# Patient Record
Sex: MALE | Race: WHITE | URBAN - METROPOLITAN AREA
[De-identification: names, ages, dates, MRNs, and addresses within clinical notes are randomized per-mention and may not be internally consistent; named-entity substitution may affect disease eponyms.]

---

## 2018-04-11 ENCOUNTER — OFFICE (OUTPATIENT)
Dept: URBAN - METROPOLITAN AREA CLINIC 78 | Facility: CLINIC | Age: 23
End: 2018-04-11

## 2018-04-11 VITALS
TEMPERATURE: 97.8 F | SYSTOLIC BLOOD PRESSURE: 115 MMHG | WEIGHT: 149 LBS | HEIGHT: 73 IN | HEART RATE: 66 BPM | DIASTOLIC BLOOD PRESSURE: 66 MMHG

## 2018-04-11 DIAGNOSIS — R68.81 EARLY SATIETY: ICD-10-CM

## 2018-04-11 DIAGNOSIS — R19.7 DIARRHEA, UNSPECIFIED: ICD-10-CM

## 2018-04-11 DIAGNOSIS — R10.13 EPIGASTRIC PAIN: ICD-10-CM

## 2018-04-11 DIAGNOSIS — R63.4 ABNORMAL WEIGHT LOSS: ICD-10-CM

## 2018-04-11 DIAGNOSIS — R63.0 ANOREXIA: ICD-10-CM

## 2018-04-11 PROCEDURE — 00031: CPT

## 2018-04-11 PROCEDURE — 99244 OFF/OP CNSLTJ NEW/EST MOD 40: CPT

## 2018-04-11 NOTE — SERVICEHPINOTES
LANCE ARIAS   is a   22   year old    female who is being seen in consultation at the request of   JERRELL MERCADO   for abdominal pain and diarrhea. His symptoms have been present for years. Stools are described as a type 5-6 on the BSS. He notes 1-5 stools a day. No regular nocturnal diarrhea. No blood in his stool. He has always had a hard time gaining weight and recently no significant weight loss. No family hx of GI issues. He notes epigastric pain which tends to be triggered by PO intake but not always. He denies heartburn/acid reflux and dysphagia. He notes a lack of appetite. He notes early satiety. He notes intermittent nausea when he is hungry. No vomiting. No regular NSAID use. No dark stool. He hasn't tried any medications for his symptoms.Blood work from 3/2018 shows a normal CBC, CMP, sed rate, and TSH. He doesn't smoke or drink ETOH.

## 2018-05-08 LAB
CELIAC DISEASE COMPREHENSIVE: DEAMIDATED GLIADIN ABS, IGA: 1 UNITS (ref 0–19)
CELIAC DISEASE COMPREHENSIVE: DEAMIDATED GLIADIN ABS, IGG: 2 UNITS (ref 0–19)
CELIAC DISEASE COMPREHENSIVE: ENDOMYSIAL ANTIBODY IGA: NEGATIVE
CELIAC DISEASE COMPREHENSIVE: IMMUNOGLOBULIN A, QN, SERUM: 91 MG/DL (ref 90–386)
CELIAC DISEASE COMPREHENSIVE: T-TRANSGLUTAMINASE (TTG) IGA: <2 U/ML
CELIAC DISEASE COMPREHENSIVE: T-TRANSGLUTAMINASE (TTG) IGG: <2 U/ML

## 2018-05-16 ENCOUNTER — OFFICE (OUTPATIENT)
Dept: URBAN - METROPOLITAN AREA CLINIC 32 | Facility: CLINIC | Age: 23
End: 2018-05-16

## 2018-05-16 VITALS
OXYGEN SATURATION: 99 % | SYSTOLIC BLOOD PRESSURE: 93 MMHG | WEIGHT: 149 LBS | HEART RATE: 89 BPM | DIASTOLIC BLOOD PRESSURE: 59 MMHG | OXYGEN SATURATION: 100 % | DIASTOLIC BLOOD PRESSURE: 67 MMHG | HEART RATE: 84 BPM | RESPIRATION RATE: 25 BRPM | RESPIRATION RATE: 19 BRPM | RESPIRATION RATE: 16 BRPM | HEART RATE: 78 BPM | HEART RATE: 91 BPM | SYSTOLIC BLOOD PRESSURE: 94 MMHG | HEART RATE: 83 BPM | HEART RATE: 54 BPM | HEIGHT: 73 IN | OXYGEN SATURATION: 98 % | RESPIRATION RATE: 15 BRPM | DIASTOLIC BLOOD PRESSURE: 69 MMHG | TEMPERATURE: 98.6 F | SYSTOLIC BLOOD PRESSURE: 111 MMHG | SYSTOLIC BLOOD PRESSURE: 96 MMHG | RESPIRATION RATE: 21 BRPM | TEMPERATURE: 98.2 F | SYSTOLIC BLOOD PRESSURE: 115 MMHG | DIASTOLIC BLOOD PRESSURE: 64 MMHG | DIASTOLIC BLOOD PRESSURE: 75 MMHG | SYSTOLIC BLOOD PRESSURE: 105 MMHG | DIASTOLIC BLOOD PRESSURE: 73 MMHG | SYSTOLIC BLOOD PRESSURE: 110 MMHG

## 2018-05-16 DIAGNOSIS — R68.81 EARLY SATIETY: ICD-10-CM

## 2018-05-16 DIAGNOSIS — R10.13 EPIGASTRIC PAIN: ICD-10-CM

## 2018-05-16 DIAGNOSIS — K63.5 POLYP OF COLON: ICD-10-CM

## 2018-05-16 DIAGNOSIS — K29.60 OTHER GASTRITIS WITHOUT BLEEDING: ICD-10-CM

## 2018-05-16 DIAGNOSIS — R19.7 DIARRHEA, UNSPECIFIED: ICD-10-CM

## 2018-05-16 PROBLEM — K64.0 FIRST DEGREE HEMORRHOIDS: Status: ACTIVE | Noted: 2018-05-16

## 2018-05-16 PROBLEM — K31.89 OTHER DISEASES OF STOMACH AND DUODENUM: Status: ACTIVE | Noted: 2018-05-16

## 2018-05-16 PROBLEM — D12.5 BENIGN NEOPLASM OF SIGMOID COLON: Status: ACTIVE | Noted: 2018-05-16

## 2018-05-16 LAB
GI LOWER POLYPECTOMY EXCISION - SPECM 1 JAR(S): 1: (no result)
GI UPPER EGD HISOLOGY - SPECM 1 JAR(S): 2: (no result)
Lab: (no result)
PDF REPORT: (no result)

## 2018-05-16 PROCEDURE — 45380 COLONOSCOPY AND BIOPSY: CPT

## 2018-05-16 PROCEDURE — 43239 EGD BIOPSY SINGLE/MULTIPLE: CPT

## 2018-05-16 RX ADMIN — LIDOCAINE HYDROCHLORIDE 40 MG: 20 INJECTION, SOLUTION INFILTRATION; PERINEURAL at 14:03

## 2018-05-16 RX ADMIN — LIDOCAINE HYDROCHLORIDE 60 MG: 20 INJECTION, SOLUTION INFILTRATION; PERINEURAL at 14:03

## 2018-05-16 NOTE — INTERFACERESULTNOTES
biopsies from stomach show mild irritation (gastritis) most likely due to acid indigestion; no other abnormality, no H pylori. biopsies from upper small intestine (duodenum), lower small intestine (ileum), and colon all are normal; no inflammation, no colitis, no Crohn's disease. colon polyp is benign, not the precancerous type. continue with current care and plans as discussed. repeat colonoscopy age 49. routine office follow up sometime in next 6+ weeks (midlevel ok).

## 2018-05-16 NOTE — SERVICEHPINOTES
Pt presents for EGD and colonoscopy due to early satiety, lack of appetite, intermittent diarrhea.